# Patient Record
(demographics unavailable — no encounter records)

---

## 2017-03-12 NOTE — ER DOCUMENT REPORT
HPI





- HPI


Patient complains to provider of: hematemesis


Pain Level: 4


Context: 


Patient is a 29-year-old male presents emergency department complaining of 

hematemesis 2.  Patient is postop day 2 from right labial tear repair and 

ligament extension of the right hip at [facility].  Patient 

states that he was under general anesthesia and had a catheter.  Patient states 

that he woke up with a sensation of something in the back of his throat and he 

coughed up some blood clots and then stated he threw up blood and hematochezia 

2.  Denies any alcohol pain now, nausea, vomiting, diarrhea.  Patient does 

admit to constipation.  States his last bowel movement was Thursday prior to 

his procedure he is taking stool softeners as prescribed.





Does has history of back problems due to injury sustained on deployment in the 

[employer]











- DERM


Skin Color: Normal





Past Medical History





- Social History


Smoking Status: Never Smoker


Chew tobacco use (# tins/day): No


Frequency of alcohol use: Social


Drug Abuse: None


Family History: Reviewed & Not Pertinent


Patient has suicidal ideation: No


Patient has homicidal ideation: No


Renal/ Medical History: Denies: Hx Peritoneal Dialysis


Past Surgical History: Reports: Hx Orthopedic Surgery - hip





Vertical Provider Document





- CONSTITUTIONAL


Agree With Documented VS: Yes


Exam Limitations: No Limitations


General Appearance: WD/WN, No Apparent Distress





- INFECTION CONTROL


TRAVEL OUTSIDE OF THE [place] IN LAST 30 DAYS: No





- HEENT


HEENT: Atraumatic, Normal ENT Exam, Normocephalic.  negative: Pharyngeal Exudate

, Pharyngeal Tenderness, Pharyngeal Erythema, Tympanic Membrane Red, Tympanic 

Membrane Bulging





- NECK


Neck: Normal Inspection





- RESPIRATORY


Respiratory: Breath Sounds Normal, No Respiratory Distress, Chest Non-Tender.  

negative: Rales, Rhonchi, Wheezing


O2 Sat by Pulse Oximetry: 96





- CARDIOVASCULAR


Cardiovascular: Regular Rate, Regular Rhythm, No Murmur


Pulses: Normal: Radial





- GI/ABDOMEN


Gastrointestinal: Abdomen Soft, Abdomen Non-Tender, No Organomegaly, Normal 

Bowel Sounds





- MUSCULOSKELETAL/EXTREMETIES


Musculoskeletal/Extremeties: MAEW, FROM, Non-Tender, No Edema.  negative: 

Eccymosis





- NEURO


Level of Consciousness: Awake, Alert, Appropriate


Motor/Sensory: No Motor Deficit, No Sensory Deficit





- DERM


Integumentary: Warm, Dry, No Rash


Notes: 


Dressing at the [facility] is clean dry and intact.  No evidence of erythema, 

induration or tenderness superficially.





Course





- Re-evaluation


Re-evalutation: 





03/12/17 12:07


Patient is a 29-year-old male presents emergency department with 2 episodes of 

hematemesis.  Patient is hemodynamically stable, no acute distress and 

afebrile.  His presentation is consistent with extubation after surgery.  At 

this time patient is well-appearing and stable for discharge.  Discussed signs 

and symptoms to be aware of to return to the emergency department.





- Vital Signs


Vital signs: 


 











Temp Pulse Resp BP Pulse Ox


 


 98.0 F   86   18   132/78 H  96 


 


 03/12/17 05:04  03/12/17 05:04  03/12/17 05:04  03/12/17 05:04  03/12/17 05:04














Discharge





- Discharge


Clinical Impression: 


 Emesis





Condition: Good


Disposition: HOME, SELF-CARE


Instructions:  Vomiting ([facility])


Additional Instructions: 


Please take your medications as prescribed 


Please follow up with your surgeon as scheduled